# Patient Record
Sex: FEMALE | Race: WHITE | NOT HISPANIC OR LATINO
[De-identification: names, ages, dates, MRNs, and addresses within clinical notes are randomized per-mention and may not be internally consistent; named-entity substitution may affect disease eponyms.]

---

## 2020-11-02 PROBLEM — Z00.00 ENCOUNTER FOR PREVENTIVE HEALTH EXAMINATION: Status: ACTIVE | Noted: 2020-11-02

## 2020-11-13 ENCOUNTER — APPOINTMENT (OUTPATIENT)
Dept: COLORECTAL SURGERY | Facility: CLINIC | Age: 75
End: 2020-11-13
Payer: MEDICARE

## 2020-11-13 VITALS
DIASTOLIC BLOOD PRESSURE: 76 MMHG | TEMPERATURE: 98.2 F | BODY MASS INDEX: 24.63 KG/M2 | HEIGHT: 63 IN | OXYGEN SATURATION: 98 % | WEIGHT: 139 LBS | SYSTOLIC BLOOD PRESSURE: 123 MMHG | HEART RATE: 66 BPM

## 2020-11-13 DIAGNOSIS — Z86.39 PERSONAL HISTORY OF OTHER ENDOCRINE, NUTRITIONAL AND METABOLIC DISEASE: ICD-10-CM

## 2020-11-13 DIAGNOSIS — Z78.9 OTHER SPECIFIED HEALTH STATUS: ICD-10-CM

## 2020-11-13 DIAGNOSIS — Z86.79 PERSONAL HISTORY OF OTHER DISEASES OF THE CIRCULATORY SYSTEM: ICD-10-CM

## 2020-11-13 DIAGNOSIS — K59.00 CONSTIPATION, UNSPECIFIED: ICD-10-CM

## 2020-11-13 DIAGNOSIS — Z80.0 FAMILY HISTORY OF MALIGNANT NEOPLASM OF DIGESTIVE ORGANS: ICD-10-CM

## 2020-11-13 DIAGNOSIS — Z85.3 PERSONAL HISTORY OF MALIGNANT NEOPLASM OF BREAST: ICD-10-CM

## 2020-11-13 PROCEDURE — 46600 DIAGNOSTIC ANOSCOPY SPX: CPT

## 2020-11-13 PROCEDURE — 99203 OFFICE O/P NEW LOW 30 MIN: CPT

## 2020-11-13 RX ORDER — OLMESARTAN MEDOXOMIL 40 MG/1
40 TABLET, FILM COATED ORAL
Refills: 0 | Status: ACTIVE | COMMUNITY

## 2020-11-13 RX ORDER — DOCUSATE SODIUM 100 MG/1
100 CAPSULE ORAL
Refills: 0 | Status: ACTIVE | COMMUNITY

## 2020-11-13 RX ORDER — ASPIRIN 81 MG/1
81 TABLET ORAL
Refills: 0 | Status: ACTIVE | COMMUNITY

## 2020-11-13 RX ORDER — ATORVASTATIN CALCIUM 20 MG/1
20 TABLET, FILM COATED ORAL
Refills: 0 | Status: ACTIVE | COMMUNITY

## 2020-11-13 RX ORDER — OMEGA-3-ACID ETHYL ESTERS 1 G/1
1 CAPSULE, LIQUID FILLED ORAL
Refills: 0 | Status: ACTIVE | COMMUNITY

## 2020-11-13 RX ORDER — LEVOTHYROXINE SODIUM 0.05 MG/1
50 TABLET ORAL
Refills: 0 | Status: ACTIVE | COMMUNITY

## 2020-11-13 RX ORDER — ACETAMINOPHEN 500 MG
TABLET ORAL
Refills: 0 | Status: ACTIVE | COMMUNITY

## 2020-11-13 NOTE — PHYSICAL EXAM
[None] : no anal fissures seen [Skin Tags] : residual hemorrhoidal skin tags were noted [Lax] : was lax [Normal Breath Sounds] : Normal breath sounds [2+] : left 2+ [No Rash or Lesion] : No rash or lesion [Alert] : alert [Oriented to Person] : oriented to person [Oriented to Place] : oriented to place [Oriented to Time] : oriented to time [Calm] : calm [Abdomen Masses] : No abdominal masses [Abdomen Tenderness] : ~T No ~M abdominal tenderness [Excoriation] : no perianal excoriation [Fistula] : no fistulas [Wart] : no warts [Ulcer ___ cm] : no ulcers [Tender, Swollen] : nontender, non-swollen [Thrombosed] : that was not thrombosed [Stool Sample Taken] : no stool obtained on rectal exam [Gross Blood] : no gross blood [JVD] : no jugular venous distention  [Thyroid] : the thyroid was abnormal [Carotid Bruits] : no carotid bruits [de-identified] : benign, well healed scar, non masses, non tender [de-identified] : groins: no masses or hernia [de-identified] : skin tags, tone not great [de-identified] : digital: can feel the coloanal anastomosis, widely patent, tone low.  Hard stool pieces, 3-4 in the rectum. Took 1 hard piece out.    No prolapse noted with pushing [de-identified] : anoscopy: fibrotic nodules along anastomosis. [de-identified] : NAD

## 2020-11-13 NOTE — ASSESSMENT
[FreeTextEntry1] : No recurrence of prolapse by hx or exam.  Had colonoscopy 2 years ago.\par Constipated with hard stools and need to push and skipped days.\par MOM works but she is reluctant to take it.\par Takes colace daily\par Urged to take MOM po after missing just 1 day of BM's\par Increase water intake (already drinks 64 ounces per day)\par Urged to try fleets enema and increase fiber intake (benefiber).

## 2020-11-13 NOTE — HISTORY OF PRESENT ILLNESS
[FreeTextEntry1] : 75 year old female s/p repair of rectal prolapse repair in August 2013.  Has been doing well. \par REcently has been having a problem with constipation and straining. \par \par Bowel movements are good for  5 days, and then has a day of constipation, constipation has been happening for  about a month.  Takes  MOM if has no  bm for 2-3 days.   TAkes MOM  less than once a month.    Takes colace , 3 pills a day, and fiber  takes  1 teaspoon a day.    Has lost  41 pounds over the last year and a half.  \par \par Last colonoscopy  was 2 years ago,  Sohan Handley.  \par \par No bleeding, no prolapse.

## 2020-11-16 ENCOUNTER — TRANSCRIPTION ENCOUNTER (OUTPATIENT)
Age: 75
End: 2020-11-16

## 2022-06-24 ENCOUNTER — APPOINTMENT (OUTPATIENT)
Dept: COLORECTAL SURGERY | Facility: CLINIC | Age: 77
End: 2022-06-24
Payer: MEDICARE

## 2022-06-24 VITALS
TEMPERATURE: 98.4 F | SYSTOLIC BLOOD PRESSURE: 125 MMHG | DIASTOLIC BLOOD PRESSURE: 83 MMHG | WEIGHT: 154.13 LBS | BODY MASS INDEX: 27.31 KG/M2 | OXYGEN SATURATION: 95 % | HEART RATE: 82 BPM | HEIGHT: 63 IN

## 2022-06-24 DIAGNOSIS — K62.3 RECTAL PROLAPSE: ICD-10-CM

## 2022-06-24 DIAGNOSIS — L29.0 PRURITUS ANI: ICD-10-CM

## 2022-06-24 PROCEDURE — 46600 DIAGNOSTIC ANOSCOPY SPX: CPT

## 2022-06-24 PROCEDURE — 99213 OFFICE O/P EST LOW 20 MIN: CPT | Mod: 25

## 2022-09-23 ENCOUNTER — LABORATORY RESULT (OUTPATIENT)
Age: 77
End: 2022-09-23

## 2022-09-23 ENCOUNTER — APPOINTMENT (OUTPATIENT)
Dept: COLORECTAL SURGERY | Facility: CLINIC | Age: 77
End: 2022-09-23

## 2022-09-23 VITALS
HEART RATE: 88 BPM | TEMPERATURE: 98 F | SYSTOLIC BLOOD PRESSURE: 135 MMHG | OXYGEN SATURATION: 98 % | BODY MASS INDEX: 27.46 KG/M2 | DIASTOLIC BLOOD PRESSURE: 73 MMHG | HEIGHT: 63 IN | WEIGHT: 155 LBS

## 2022-09-23 PROCEDURE — 99214 OFFICE O/P EST MOD 30 MIN: CPT | Mod: 25

## 2022-09-23 PROCEDURE — 46606 ANOSCOPY AND BIOPSY: CPT

## 2022-09-23 NOTE — PHYSICAL EXAM
[Nonprolapsing] : a nonprolapsing (grade I) [Normal Breath Sounds] : Normal breath sounds [Normal Heart Sounds] : normal heart sounds [2+] : left 2+ [No Rash or Lesion] : No rash or lesion [Alert] : alert [Oriented to Person] : oriented to person [Oriented to Place] : oriented to place [Oriented to Time] : oriented to time [Calm] : calm [Multiple Sinus Tracts] : no perianal sinus tracts [Stool Sample Taken] : no stool obtained on rectal exam [Gross Blood] : no gross blood [de-identified] : lower midline and lower transverse incisions  [de-identified] : small anterior tag is mildly inflamed [de-identified] : NAD [Lax] : was lax [None] : there was no rectal mass  [Excoriation] : no perianal excoriation [Fistula] : no fistulas [Wart] : no warts [Ulcer ___ cm] : no ulcers [Tender, Swollen] : nontender, non-swollen [Thrombosed] : that was not thrombosed [Skin Tags] : there were no residual hemorrhoidal skin tags seen [de-identified] : outer rash 90 % better.  some redness and granular surface but no ulcerations.  Much improved.  Digital: can feel the R lateral area of fibrosis feintly.  Anoscopy: RMP and internal porlapse noted.  mucosa looks thickened and dematous but that’s all, otherwise ok.  R lateral wall at dentate area there is a 1-1.5 cm x 1.5 cm area of white scar / fibrosis in columns. [de-identified] : RMP, internal proalpse

## 2022-09-23 NOTE — HISTORY OF PRESENT ILLNESS
[FreeTextEntry1] : f/u to check rash that was severe and was treated with calmoseptine and R lateral fibrotic region\par hx prolapse in past and found to have partial circumference internal prolapse in June 2022.

## 2022-09-23 NOTE — ASSESSMENT
[FreeTextEntry1] : Rash much improved.\par to continue with calmoseptine prn\par \par biopsy of scarred right anodermal and dentate area taken and sent to path.\par No bleeding.\par \par would like to f/u in 4-6 months.

## 2022-09-23 NOTE — PHYSICAL EXAM
[de-identified] : rash much improved, no ulcerations, redness only and sliglhty granular surface.  Anoscopy: R lateral area of white scarring and fibrosis again noted, not worse

## 2022-09-23 NOTE — ASSESSMENT
[FreeTextEntry1] : Rash much improved.  Calmoseptine is working.  To continue on prn basis with that cream\par \par R lateral fibrotic scarred columns area looks about the same.   biopsies taken of the scar.  Was hard to bx.  If these bx's not good may have to get deeper biopsies.  \par \par No bleeding post bx.  Consent obtained before bx taken.\par \par To call for problems.

## 2022-09-23 NOTE — PROCEDURE
[FreeTextEntry1] : post consent\par anoscpic facilitiate biopsy of the R lateral dentate line region (bites of white scar from columns) take. No bleeding ensued.  Bx send to path for analysis, forms filled out and container labeled.

## 2022-09-23 NOTE — PROCEDURE
[FreeTextEntry1] : after consent\par anoscopy done and biopsy x 3 taken of fibrotic surface orf right lateral dentate line and anodermal region\par Sent to path\par No bleeding

## 2022-09-23 NOTE — PHYSICAL EXAM
[Nonprolapsing] : a nonprolapsing (grade I) [Normal Breath Sounds] : Normal breath sounds [Normal Heart Sounds] : normal heart sounds [2+] : left 2+ [No Rash or Lesion] : No rash or lesion [Alert] : alert [Oriented to Person] : oriented to person [Oriented to Place] : oriented to place [Oriented to Time] : oriented to time [Calm] : calm [Multiple Sinus Tracts] : no perianal sinus tracts [Stool Sample Taken] : no stool obtained on rectal exam [Gross Blood] : no gross blood [de-identified] : lower midline and lower transverse incisions  [de-identified] : small anterior tag is mildly inflamed [de-identified] : NAD [Lax] : was lax [None] : there was no rectal mass  [Excoriation] : no perianal excoriation [Fistula] : no fistulas [Wart] : no warts [Ulcer ___ cm] : no ulcers [Tender, Swollen] : nontender, non-swollen [Thrombosed] : that was not thrombosed [Skin Tags] : there were no residual hemorrhoidal skin tags seen [de-identified] : outer rash 90 % better.  some redness and granular surface but no ulcerations.  Much improved.  Digital: can feel the R lateral area of fibrosis feintly.  Anoscopy: RMP and internal porlapse noted.  mucosa looks thickened and dematous but that’s all, otherwise ok.  R lateral wall at dentate area there is a 1-1.5 cm x 1.5 cm area of white scar / fibrosis in columns. [de-identified] : RMP, internal proalpse

## 2022-09-23 NOTE — HISTORY OF PRESENT ILLNESS
[FreeTextEntry1] : 76 yo woman s/p prolapse surgery who was seen in June 2022.  Noted to have fibrotic, scarred area that I wanted to follow and, possibly, biopsy if it persisted or changed.  She also noted to have internal full thickness partial circumferential prolapse that was not bothering the patient.  She notes no external prolapse.\par She also had a severe perianal skin rash and skin ulcerations and irritation that was treated with calmoseptine.\par \par No change in her bowel habits or symptoms.

## 2022-10-31 ENCOUNTER — APPOINTMENT (OUTPATIENT)
Dept: COLORECTAL SURGERY | Facility: CLINIC | Age: 77
End: 2022-10-31

## 2022-10-31 VITALS
OXYGEN SATURATION: 98 % | BODY MASS INDEX: 29.83 KG/M2 | HEART RATE: 60 BPM | HEIGHT: 61 IN | TEMPERATURE: 96.7 F | SYSTOLIC BLOOD PRESSURE: 153 MMHG | DIASTOLIC BLOOD PRESSURE: 88 MMHG | WEIGHT: 158 LBS

## 2022-10-31 DIAGNOSIS — Z01.818 ENCOUNTER FOR OTHER PREPROCEDURAL EXAMINATION: ICD-10-CM

## 2022-10-31 DIAGNOSIS — K62.89 OTHER SPECIFIED DISEASES OF ANUS AND RECTUM: ICD-10-CM

## 2022-10-31 PROCEDURE — 99212 OFFICE O/P EST SF 10 MIN: CPT | Mod: 25

## 2022-10-31 PROCEDURE — 46600 DIAGNOSTIC ANOSCOPY SPX: CPT

## 2022-10-31 NOTE — PHYSICAL EXAM
[Nonprolapsing] : a nonprolapsing (grade I) [Normal] : was normal [None] : there was no rectal abscess [___ Right Side] : a [unfilled] ~Ucm rectal mass was present on the right [Normal Breath Sounds] : Normal breath sounds [Normal Heart Sounds] : normal heart sounds [2+] : left 2+ [No Rash or Lesion] : No rash or lesion [Alert] : alert [Oriented to Person] : oriented to person [Oriented to Place] : oriented to place [Oriented to Time] : oriented to time [Calm] : calm [Excoriation] : no perianal excoriation [Fistula] : no fistulas [Wart] : no warts [Ulcer ___ cm] : no ulcers [Tender, Swollen] : nontender, non-swollen [Thrombosed] : that was not thrombosed [Skin Tags] : there were no residual hemorrhoidal skin tags seen [Stool Sample Taken] : no stool obtained on rectal exam [Gross Blood] : no gross blood [JVD] : no jugular venous distention  [Thyroid] : the thyroid was abnormal [Carotid Bruits] : no carotid bruits [de-identified] : healed midline scar, no hernia [de-identified] : externla rash is much better,no ulcerations or lesions noted.  Digital: hard scarred right lateral area again noted.  Anoscopy: 1.3 x 1.5 cm white colored lesion (?scar) at the dentate line level.   [de-identified] : NAD

## 2022-10-31 NOTE — ASSESSMENT
[FreeTextEntry1] : R lateral at dentate line, sizable hard area with small focally elevated area.\par Office bx not conclusive.\par Recommending EUA and biopsy in the operating room on ambulatory basis. Will excise area if possible.\par R/o neoplasm.\par Odd appearing area.\par Perianal skin rash is much better now having used the calmoseptine cream x few months\par Med clearance requested

## 2022-10-31 NOTE — HISTORY OF PRESENT ILLNESS
[FreeTextEntry1] : 77 year old female here for follow up of anal  lesion / rash.    Bx done in september was inconclusive. \par \par Had prolapse repair 9  years ago, feels  it's starting to come down, but it does spontaneously reduce.

## 2022-11-18 DIAGNOSIS — Z01.812 ENCOUNTER FOR PREPROCEDURAL LABORATORY EXAMINATION: ICD-10-CM

## 2022-11-18 DIAGNOSIS — Z20.822 ENCOUNTER FOR PREPROCEDURAL LABORATORY EXAMINATION: ICD-10-CM

## 2022-11-30 ENCOUNTER — TRANSCRIPTION ENCOUNTER (OUTPATIENT)
Age: 77
End: 2022-11-30

## 2022-11-30 NOTE — ASU PATIENT PROFILE, ADULT - NS PREOP UNDERSTANDS INFO
No solid food/dairy/candy/gum after midnight, water before 08:30am tomorrow; Patient to bring photo ID/insurance card; dress in comfortable clothes; no jewelries/valuables/contact lens; no smoking/alcohol/drug use tonight; escort must be atleast 18 yrs old and must come with photo ID. Address and call back number was given./yes

## 2022-12-01 ENCOUNTER — TRANSCRIPTION ENCOUNTER (OUTPATIENT)
Age: 77
End: 2022-12-01

## 2022-12-01 ENCOUNTER — OUTPATIENT (OUTPATIENT)
Dept: OUTPATIENT SERVICES | Facility: HOSPITAL | Age: 77
LOS: 1 days | Discharge: HOME | End: 2022-12-01

## 2022-12-01 ENCOUNTER — APPOINTMENT (OUTPATIENT)
Dept: COLORECTAL SURGERY | Facility: HOSPITAL | Age: 77
End: 2022-12-01

## 2022-12-01 VITALS
WEIGHT: 158.73 LBS | DIASTOLIC BLOOD PRESSURE: 67 MMHG | TEMPERATURE: 98 F | SYSTOLIC BLOOD PRESSURE: 164 MMHG | RESPIRATION RATE: 16 BRPM | HEIGHT: 63 IN | OXYGEN SATURATION: 99 % | HEART RATE: 68 BPM

## 2022-12-01 VITALS
HEART RATE: 69 BPM | SYSTOLIC BLOOD PRESSURE: 145 MMHG | RESPIRATION RATE: 15 BRPM | TEMPERATURE: 97 F | DIASTOLIC BLOOD PRESSURE: 68 MMHG

## 2022-12-01 DIAGNOSIS — Z90.710 ACQUIRED ABSENCE OF BOTH CERVIX AND UTERUS: Chronic | ICD-10-CM

## 2022-12-01 DIAGNOSIS — Z90.13 ACQUIRED ABSENCE OF BILATERAL BREASTS AND NIPPLES: Chronic | ICD-10-CM

## 2022-12-01 PROCEDURE — 45171 EXC RECT TUM TRANSANAL PART: CPT

## 2022-12-01 RX ORDER — OLMESARTAN MEDOXOMIL 5 MG/1
1 TABLET, FILM COATED ORAL
Qty: 0 | Refills: 0 | DISCHARGE

## 2022-12-01 RX ORDER — CIPROFLOXACIN LACTATE 400MG/40ML
2 VIAL (ML) INTRAVENOUS
Qty: 20 | Refills: 0
Start: 2022-12-01 | End: 2022-12-05

## 2022-12-01 RX ORDER — LEVOTHYROXINE SODIUM 125 MCG
1 TABLET ORAL
Qty: 0 | Refills: 0 | DISCHARGE

## 2022-12-01 RX ORDER — KETOROLAC TROMETHAMINE 30 MG/ML
1 SYRINGE (ML) INJECTION
Qty: 12 | Refills: 0
Start: 2022-12-01 | End: 2022-12-03

## 2022-12-01 RX ORDER — ASPIRIN/CALCIUM CARB/MAGNESIUM 324 MG
1 TABLET ORAL
Qty: 0 | Refills: 0 | DISCHARGE

## 2022-12-01 RX ORDER — OXYCODONE AND ACETAMINOPHEN 5; 325 MG/1; MG/1
1 TABLET ORAL
Qty: 5 | Refills: 0
Start: 2022-12-01

## 2022-12-01 RX ORDER — METRONIDAZOLE 500 MG
1 TABLET ORAL
Qty: 15 | Refills: 0
Start: 2022-12-01 | End: 2022-12-05

## 2022-12-01 RX ORDER — KETOROLAC TROMETHAMINE 30 MG/ML
15 SYRINGE (ML) INJECTION ONCE
Refills: 0 | Status: DISCONTINUED | OUTPATIENT
Start: 2022-12-01 | End: 2022-12-01

## 2022-12-01 RX ADMIN — Medication 15 MILLIGRAM(S): at 14:45

## 2022-12-01 RX ADMIN — Medication 15 MILLIGRAM(S): at 13:47

## 2022-12-01 NOTE — ASU DISCHARGE PLAN (ADULT/PEDIATRIC) - ASU DC SPECIAL INSTRUCTIONSFT
Please take antibiotics for 5d as perscribed to help prevent infection    Please take toradol as needed for severe pain    Please take percocet as needed for severe pain not controlled by toradol. Do not take tylenol when taking percocet.    Please see instructions in pre-printed packet (nurse can obtain from chart)

## 2022-12-01 NOTE — BRIEF OPERATIVE NOTE - OPERATION/FINDINGS
Patient was anesthetised and proned. Patient was prepped and draped. Anus examined via anoscopy. right sided rectal mass identified. Partial thickness excision of mass made. Flap created. wound closed. Stool was noted in the rectal cavity. hemostasis achieved.

## 2022-12-01 NOTE — ASU DISCHARGE PLAN (ADULT/PEDIATRIC) - NS MD DC FALL RISK RISK
For information on Fall & Injury Prevention, visit: [email or web address] OR  [email or web address] OR  https://www.cdc.gov/steadi/patient.html

## 2022-12-01 NOTE — ASU DISCHARGE PLAN (ADULT/PEDIATRIC) - CARE PROVIDER_API CALL
PCP for Immediate Care
Juan Pablo Sorto)  ColonRectal Surgery  [address], [place] [address]  Phone: [phone]  Fax: [phone]  Follow Up Time: 1 week

## 2022-12-01 NOTE — BRIEF OPERATIVE NOTE - NSICDXBRIEFPROCEDURE_GEN_ALL_CORE_FT
PROCEDURES:  Biopsy of rectal mass 01-Dec-2022 13:48:12 Transanal excision of rectal mass Braden Moscoso

## 2022-12-02 ENCOUNTER — RESULT REVIEW (OUTPATIENT)
Age: 77
End: 2022-12-02

## 2022-12-02 PROBLEM — Z87.19 PERSONAL HISTORY OF OTHER DISEASES OF THE DIGESTIVE SYSTEM: Chronic | Status: ACTIVE | Noted: 2022-12-01

## 2022-12-02 PROBLEM — I10 ESSENTIAL (PRIMARY) HYPERTENSION: Chronic | Status: ACTIVE | Noted: 2022-12-01

## 2022-12-02 PROBLEM — C50.919 MALIGNANT NEOPLASM OF UNSPECIFIED SITE OF UNSPECIFIED FEMALE BREAST: Chronic | Status: ACTIVE | Noted: 2022-12-01

## 2022-12-02 PROCEDURE — 88305 TISSUE EXAM BY PATHOLOGIST: CPT | Mod: 26

## 2022-12-05 LAB — SURGICAL PATHOLOGY STUDY: SIGNIFICANT CHANGE UP

## 2022-12-06 ENCOUNTER — APPOINTMENT (OUTPATIENT)
Dept: COLORECTAL SURGERY | Facility: CLINIC | Age: 77
End: 2022-12-06

## 2022-12-08 ENCOUNTER — APPOINTMENT (OUTPATIENT)
Dept: COLORECTAL SURGERY | Facility: CLINIC | Age: 77
End: 2022-12-08

## 2022-12-12 ENCOUNTER — APPOINTMENT (OUTPATIENT)
Dept: COLORECTAL SURGERY | Facility: CLINIC | Age: 77
End: 2022-12-12

## 2022-12-12 PROCEDURE — 99024 POSTOP FOLLOW-UP VISIT: CPT

## 2022-12-12 NOTE — PHYSICAL EXAM
[FreeTextEntry1] : PE: \par \par AOA, looks well\par No JVD, no ADDISON,\par no distress\par External anal:  small post soft thrombosed hemorrhoid, no bleeding.  No rashes, no bleeding, no induration, no discharge\par Unable to visualize  anal wound. \par \par

## 2022-12-12 NOTE — ASSESSMENT
[FreeTextEntry1] : Excision of  anal mass: benign,\par Needs  routine follow up. \par Will follow up in 6-8 weeks  for anoscopy with  Macario.

## 2022-12-12 NOTE — HISTORY OF PRESENT ILLNESS
[FreeTextEntry1] : 77 year old female  s/p transanal excision of  rectal mass on 12/1/22. \par Doing well.  Episodic bleeding with hard stool, no pain.\par On colace, doing warm baths.  \par concerned over pathology. ( they accessed the results on line).

## 2023-01-09 ENCOUNTER — APPOINTMENT (OUTPATIENT)
Dept: COLORECTAL SURGERY | Facility: CLINIC | Age: 78
End: 2023-01-09
Payer: MEDICARE

## 2023-01-09 VITALS
HEIGHT: 61 IN | OXYGEN SATURATION: 99 % | WEIGHT: 161 LBS | DIASTOLIC BLOOD PRESSURE: 71 MMHG | SYSTOLIC BLOOD PRESSURE: 135 MMHG | TEMPERATURE: 97.6 F | BODY MASS INDEX: 30.4 KG/M2 | HEART RATE: 64 BPM

## 2023-01-09 PROCEDURE — 46600 DIAGNOSTIC ANOSCOPY SPX: CPT | Mod: 58

## 2023-01-09 PROCEDURE — 99024 POSTOP FOLLOW-UP VISIT: CPT

## 2023-01-09 NOTE — ASSESSMENT
[FreeTextEntry1] : Healed well.\par No problems noted.\par BM's occurring WNL\par \par Path shows no neoplasm.  Hyperkeratosis is main finding\par Wound looks fine\par \par RTC 4-8 weeks\par

## 2023-01-09 NOTE — HISTORY OF PRESENT ILLNESS
[FreeTextEntry1] : 77 year old female  s/p transanal excision of  rectal mass on 12/1/22. \par Doing well.  Had some bleeding last week, none this week. \par Path: benign hyperkeratosis.

## 2023-01-09 NOTE — PHYSICAL EXAM
[Nonprolapsing] : a nonprolapsing (grade I) [Skin Tags] : residual hemorrhoidal skin tags were noted [Normal] : was normal [None] : there was no rectal mass  [Excoriation] : no perianal excoriation [Fistula] : no fistulas [Wart] : no warts [Ulcer ___ cm] : no ulcers [Tender, Swollen] : nontender, non-swollen [Thrombosed] : that was not thrombosed [Stool Sample Taken] : no stool obtained on rectal exam [Gross Blood] : no gross blood [de-identified] : ext: WNL, no wounds or issues.  digital: lumen is fine.  no induration or sinus or other noted.  Anoscopy: well healed, lesser white patches/scarring, otherwise fine

## 2023-09-15 ENCOUNTER — TRANSCRIPTION ENCOUNTER (OUTPATIENT)
Age: 78
End: 2023-09-15

## 2024-10-25 ENCOUNTER — APPOINTMENT (OUTPATIENT)
Dept: COLORECTAL SURGERY | Facility: CLINIC | Age: 79
End: 2024-10-25
Payer: MEDICARE

## 2024-10-25 VITALS — RESPIRATION RATE: 18 BRPM

## 2024-10-25 VITALS
HEIGHT: 61 IN | SYSTOLIC BLOOD PRESSURE: 142 MMHG | OXYGEN SATURATION: 97 % | TEMPERATURE: 97.1 F | DIASTOLIC BLOOD PRESSURE: 81 MMHG | WEIGHT: 151 LBS | HEART RATE: 67 BPM | BODY MASS INDEX: 28.51 KG/M2

## 2024-10-25 DIAGNOSIS — K62.3 RECTAL PROLAPSE: ICD-10-CM

## 2024-10-25 PROCEDURE — 99214 OFFICE O/P EST MOD 30 MIN: CPT

## (undated) DEVICE — SUT VICRYL 3-0 18" SH UNDYED (POP-OFF)

## (undated) DEVICE — PACK COLO RECTAL

## (undated) DEVICE — ELCTR BOVIE PENCIL SMOKE EVACUATION

## (undated) DEVICE — SUT CHROMIC 3-0 27" SH

## (undated) DEVICE — SLV COMPRESSION KNEE MED

## (undated) DEVICE — WARMING BLANKET UPPER ADULT

## (undated) DEVICE — GLV 8 PROTEXIS (WHITE)

## (undated) DEVICE — GLV 7.5 PROTEXIS (WHITE)

## (undated) DEVICE — FRAZIER SUCTION TIP 10FR